# Patient Record
Sex: MALE | Race: WHITE
[De-identification: names, ages, dates, MRNs, and addresses within clinical notes are randomized per-mention and may not be internally consistent; named-entity substitution may affect disease eponyms.]

---

## 2021-03-11 ENCOUNTER — HOSPITAL ENCOUNTER (OUTPATIENT)
Dept: HOSPITAL 60 - LB.SDS | Age: 56
Discharge: HOME | End: 2021-03-11
Attending: SURGERY
Payer: MEDICAID

## 2021-03-11 DIAGNOSIS — R73.03: ICD-10-CM

## 2021-03-11 DIAGNOSIS — D12.2: ICD-10-CM

## 2021-03-11 DIAGNOSIS — Z12.11: Primary | ICD-10-CM

## 2021-03-11 NOTE — OR
DATE OF OPERATION:  03/11/2021

 

SURGEON:  Yayo Noel MD

 

PREOPERATIVE DIAGNOSIS:  Screening colonoscopy.

 

POSTOPERATIVE DIAGNOSIS:  Screening colonoscopy.

 

PROCEDURE:  Colonoscopy with polypectomy.

 

ANESTHESIA:  MAC.

 

ESTIMATED BLOOD LOSS:  Minimal.

 

COMPLICATIONS:  None.

 

INDICATION FOR THE PROCEDURE:  The patient is a 56-year-old male here today for his first

colonoscopy.  No family history.  No change in bowel habits.

 

DESCRIPTION OF PROCEDURE:  Informed consent was obtained from the patient.  The patient was

taken to the operating room, placed on table in the left lateral decubitus position.

Monitored anesthesia care was administered.  Digital rectal exam performed, it was normal.

Colonoscope was then advanced through the anus, directed towards the cecum.  Cecum was

reached and identified by appendiceal orifice and ileocecal valve.  Colonoscope was then

slowly withdrawn.  He did have 2 polyps in the ascending colon, 1 small separate

pedunculated polyp.  This was removed with hot snare polypectomy and a second larger sessile

polyp immediately adjacent to that.  This was removed in piecemeal fashion using hot snare

as well as hot biopsy forceps.  Some residual polyp may be remaining.  We will bring back in

3 months for repeat look.  The remainder of the colon was otherwise unremarkable.

 

FINDINGS:  Ascending colon polyps x2.

 

RECOMMENDATIONS:  We will see back again in 3 months for repeat colonoscopy and additional

polypectomy.

 

 

MN/KATHRYN

DD:  03/11/2021 15:23:50

DT:  03/11/2021 19:11:46

Job #:  [id number]

## 2021-11-11 ENCOUNTER — HOSPITAL ENCOUNTER (OUTPATIENT)
Dept: HOSPITAL 60 - LB.SDS | Age: 56
Discharge: HOME | End: 2021-11-11
Attending: SURGERY
Payer: MEDICAID

## 2021-11-11 DIAGNOSIS — D12.2: ICD-10-CM

## 2021-11-11 DIAGNOSIS — Z12.11: Primary | ICD-10-CM

## 2021-11-11 DIAGNOSIS — I10: ICD-10-CM

## 2021-11-11 NOTE — OR
DATE OF OPERATION:  11/11/2021

 

SURGEON:  Yayo Noel MD

 

PREOPERATIVE DIAGNOSIS:  History of colon polyps.

 

POSTOPERATIVE DIAGNOSIS:  History of colon polyps.

 

PROCEDURE:  Colonoscopy with polypectomy.

 

ANESTHESIA:  MAC.

 

ESTIMATED BLOOD LOSS:  Minimal.

 

COMPLICATIONS:  None.

 

INDICATION FOR THE PROCEDURE:  The patient is a 56-year-old male who was previously scoped

about 8 months ago, was found to have a large sessile polyp in the ascending colon.  This

was resected in piecemeal fashion.  He was brought back today for a review of the

polypectomy site and any further polyp resection.

 

DESCRIPTION OF PROCEDURE:  Informed consent was obtained from the patient.  The patient was

taken to the operating room, placed on table in left lateral decubitus position.  Monitored

anesthesia care was administered.  Digital rectal exam performed, it was normal.

Colonoscope was then advanced through the anus, directed towards the cecum.  Cecum was

reached and identified by appendiceal orifice and ileocecal valve.  Colonoscope was then

slowly withdrawn.  Polyp in the ascending colon noted again, this was significantly smaller

than on the last scope.  Polyp was removed with hot snare as well as hot forceps

polypectomy.  Did appear to have fully resected the polyp at this time.  The colonoscope

then further withdrawn.  No additional polyps identified.  Rectum was also otherwise

unremarkable.  Colonoscope then withdrawn.

 

FINDINGS:  Ascending colon polyp again identified and resected today.

 

RECOMMENDATIONS:  Would recommend repeat surveillance colonoscopy in 1 year due to size of

the initial polyp.  We will also follow up on biopsy results.

 

 

MN/KATHRYN

DD:  11/11/2021 13:29:32

DT:  11/11/2021 13:45:36

Job #:  [id number]

## 2023-05-11 ENCOUNTER — HOSPITAL ENCOUNTER (OUTPATIENT)
Dept: HOSPITAL 60 - LB.SDS | Age: 58
Discharge: HOME | End: 2023-05-11
Attending: SURGERY
Payer: MEDICAID

## 2023-05-11 DIAGNOSIS — Z12.11: Primary | ICD-10-CM

## 2023-05-11 DIAGNOSIS — K57.30: ICD-10-CM

## 2023-05-11 DIAGNOSIS — E11.9: ICD-10-CM

## 2023-05-11 DIAGNOSIS — D12.4: ICD-10-CM

## 2023-05-11 DIAGNOSIS — I10: ICD-10-CM

## 2023-05-11 DIAGNOSIS — Z86.010: ICD-10-CM

## 2023-05-11 PROCEDURE — 45385 COLONOSCOPY W/LESION REMOVAL: CPT

## 2023-05-11 PROCEDURE — 82947 ASSAY GLUCOSE BLOOD QUANT: CPT

## 2023-05-11 PROCEDURE — 88305 TISSUE EXAM BY PATHOLOGIST: CPT
